# Patient Record
Sex: FEMALE | Race: BLACK OR AFRICAN AMERICAN | Employment: OTHER | URBAN - METROPOLITAN AREA
[De-identification: names, ages, dates, MRNs, and addresses within clinical notes are randomized per-mention and may not be internally consistent; named-entity substitution may affect disease eponyms.]

---

## 2018-03-26 ENCOUNTER — TRANSFERRED RECORDS (OUTPATIENT)
Dept: HEALTH INFORMATION MANAGEMENT | Facility: CLINIC | Age: 58
End: 2018-03-26

## 2019-08-02 LAB
ALT SERPL-CCNC: 15 IU/L
AST SERPL-CCNC: 13 IU/L (ref 5–40)
CREAT SERPL-MCNC: 1.21 MG/DL (ref 0.5–1)
GFR SERPL CREATININE-BSD FRML MDRD: 49 ML/MIN/1.73M2
GLUCOSE SERPL-MCNC: 105 MG/DL (ref 70–100)
POTASSIUM SERPL-SCNC: 4.3 MEQ/L (ref 3.5–5.3)
TSH SERPL-ACNC: 1.12 MU/L (ref 0.27–4.2)

## 2019-09-18 ENCOUNTER — TRANSFERRED RECORDS (OUTPATIENT)
Dept: HEALTH INFORMATION MANAGEMENT | Facility: CLINIC | Age: 59
End: 2019-09-18

## 2019-11-01 ENCOUNTER — OFFICE VISIT (OUTPATIENT)
Dept: NEUROLOGY | Facility: CLINIC | Age: 59
End: 2019-11-01
Payer: MEDICARE

## 2019-11-01 VITALS
HEIGHT: 65 IN | WEIGHT: 175.8 LBS | SYSTOLIC BLOOD PRESSURE: 128 MMHG | HEART RATE: 100 BPM | BODY MASS INDEX: 29.29 KG/M2 | DIASTOLIC BLOOD PRESSURE: 84 MMHG | OXYGEN SATURATION: 97 %

## 2019-11-01 DIAGNOSIS — R56.9 CONVULSIONS, UNSPECIFIED CONVULSION TYPE (H): Primary | ICD-10-CM

## 2019-11-01 PROCEDURE — 99214 OFFICE O/P EST MOD 30 MIN: CPT | Performed by: PSYCHIATRY & NEUROLOGY

## 2019-11-01 RX ORDER — SOLIFENACIN SUCCINATE 5 MG/1
1 TABLET, FILM COATED ORAL DAILY
COMMUNITY
Start: 2019-10-16

## 2019-11-01 RX ORDER — RISPERIDONE 4 MG/1
2 TABLET ORAL 2 TIMES DAILY
Refills: 5 | COMMUNITY
Start: 2019-09-21

## 2019-11-01 RX ORDER — LANOLIN ALCOHOL/MO/W.PET/CERES
6 CREAM (GRAM) TOPICAL AT BEDTIME
Refills: 5 | COMMUNITY
Start: 2019-10-17

## 2019-11-01 RX ORDER — FLUTICASONE PROPIONATE 50 MCG
2 SPRAY, SUSPENSION (ML) NASAL PRN
COMMUNITY
Start: 2019-04-26

## 2019-11-01 RX ORDER — MIRTAZAPINE 15 MG/1
7.5 TABLET, FILM COATED ORAL AT BEDTIME
COMMUNITY
Start: 2015-10-23

## 2019-11-01 RX ORDER — OXCARBAZEPINE 300 MG/1
2 TABLET, FILM COATED ORAL 2 TIMES DAILY
Refills: 5 | COMMUNITY
Start: 2019-10-17

## 2019-11-01 RX ORDER — BISACODYL 10 MG
10 SUPPOSITORY, RECTAL RECTAL PRN
COMMUNITY
Start: 2018-01-30

## 2019-11-01 RX ORDER — TRAZODONE HYDROCHLORIDE 150 MG/1
300 TABLET ORAL AT BEDTIME
COMMUNITY
Start: 2015-10-23

## 2019-11-01 RX ORDER — OMEGA-3-ACID ETHYL ESTERS 1 G/1
1 CAPSULE, LIQUID FILLED ORAL 2 TIMES DAILY
COMMUNITY
Start: 2019-08-20

## 2019-11-01 RX ORDER — PREGABALIN 75 MG/1
1 CAPSULE ORAL 2 TIMES DAILY
COMMUNITY
Start: 2018-12-04

## 2019-11-01 RX ORDER — ICOSAPENT ETHYL 1000 MG/1
1 CAPSULE ORAL 2 TIMES DAILY
Refills: 11 | COMMUNITY
Start: 2019-10-27

## 2019-11-01 RX ORDER — DOCUSATE SODIUM 100 MG/1
100 CAPSULE, LIQUID FILLED ORAL PRN
COMMUNITY

## 2019-11-01 RX ORDER — LEVETIRACETAM 750 MG/1
750 TABLET ORAL 2 TIMES DAILY
COMMUNITY
Start: 2018-12-12

## 2019-11-01 RX ORDER — HYDROXYZINE HYDROCHLORIDE 50 MG/1
50-100 TABLET, FILM COATED ORAL
COMMUNITY
Start: 2019-07-06

## 2019-11-01 ASSESSMENT — PAIN SCALES - GENERAL: PAINLEVEL: NO PAIN (0)

## 2019-11-01 ASSESSMENT — MIFFLIN-ST. JEOR: SCORE: 1373.3

## 2019-11-01 NOTE — NURSING NOTE
"Jessica Savage's goals for this visit include: consult  She requests these members of her care team be copied on today's visit information:     PCP: Rick Kapoor    Referring Provider:  No referring provider defined for this encounter.    /84   Pulse 100   Ht 1.651 m (5' 5\")   Wt 79.7 kg (175 lb 12.8 oz)   SpO2 97%   BMI 29.25 kg/m      Do you need any medication refills at today's visit? n  "
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
## 2019-11-05 ENCOUNTER — TELEPHONE (OUTPATIENT)
Dept: NEUROLOGY | Facility: CLINIC | Age: 59
End: 2019-11-05

## 2019-11-05 NOTE — TELEPHONE ENCOUNTER
[facility] Call Center    Phone Message    May a detailed message be left on voicemail: yes    Reason for Call: Other: patient needs mri order sent to [facility] in [facility] before she can schedule. tried calling and they didnt have it. also, patient wants to know if she should get mri before or after her epilepsy appointment?     Action Taken: Message routed to:  Adult Clinics: Neurology p [id number]

## 2019-11-05 NOTE — TELEPHONE ENCOUNTER
Dr Deleon,  Is it correct that the pt can have the 3T MRI while she is in the hospital for her inpatient EEG or do you want it done prior at [facility]?    Orquidea Stoll RN

## 2019-11-06 NOTE — TELEPHONE ENCOUNTER
Jenelle Salas MD Diaz, Melissa A RN   Caller: Unspecified (Yesterday,  1:26 PM)             Preferably before admission, but if that delays the admission, it's ok to do it in the hospital.

## 2019-11-08 ENCOUNTER — HOSPITAL ENCOUNTER (OUTPATIENT)
Facility: CLINIC | Age: 59
End: 2019-11-08
Attending: PSYCHIATRY & NEUROLOGY | Admitting: PSYCHIATRY & NEUROLOGY
Payer: MEDICARE

## 2019-11-14 ENCOUNTER — TRANSFERRED RECORDS (OUTPATIENT)
Dept: HEALTH INFORMATION MANAGEMENT | Facility: CLINIC | Age: 59
End: 2019-11-14

## 2019-12-02 ENCOUNTER — ALLIED HEALTH/NURSE VISIT (OUTPATIENT)
Dept: NURSING | Facility: CLINIC | Age: 59
End: 2019-12-02
Payer: MEDICARE

## 2019-12-02 ENCOUNTER — PATIENT OUTREACH (OUTPATIENT)
Dept: NEUROLOGY | Facility: CLINIC | Age: 59
End: 2019-12-02

## 2019-12-02 VITALS
DIASTOLIC BLOOD PRESSURE: 73 MMHG | HEART RATE: 91 BPM | SYSTOLIC BLOOD PRESSURE: 105 MMHG | RESPIRATION RATE: 30 BRPM | OXYGEN SATURATION: 100 %

## 2019-12-02 DIAGNOSIS — R56.9 CONVULSIONS (H): Primary | ICD-10-CM

## 2019-12-02 PROCEDURE — 99211 OFF/OP EST MAY X REQ PHY/QHP: CPT

## 2019-12-02 NOTE — PROGRESS NOTES
Pre Admission contact prior to inpatient continuous video EEG monitoring at the [facility]    MD provided information:  MINCEP provider:Jenelle Salas M.D.  Admission date/time: 12/3/19  Reason for admit Event characterization - Differential Diagnosis       Nurse Admission Checklist:  Nurse education regarding admission:Met with Clinic Nurse: With Susana Stoll RN today.  EEG prior to admission? Yes   VEEG Consent scanned on file :No  Imaging files location: [facility]    Additional care needs -  Does the patient have behavioral needs?: No  If patient has developmental delay is family staying? n/a    What level of ambulation assistance required: Gait Belt    Nicotine use; tolerance of nicotine patch. n/a, nonsmoker     needs: No    For admitting nurse:  Is this a local patient? Yes  Home environment : lives alone with skilled nursing home visits.   Discharge transportation: Private Car   Harness room: No        Tiffani Bautista RN  Epilepsy Nurse Coordinator

## 2019-12-02 NOTE — PROGRESS NOTES
Jessica Savage comes into clinic today at the request of Dr Deleon Ordering Provider for Pt Teaching for inpatient video EEG scheduled tomorrow.    RN noticed as we were walking to the exam room that she was breathing heavily. She states that this is not a new thing but seemed to have gotten worse over Thanksgiving while traveling to [place] to see her family.     Vital signs taken and besides the elevated respiratory rate, they were normal /73   Pulse 91   Resp 30   SpO2 100% . She denies chest/arm/back/neck pain, vision changes, new weakness or fever.   She denies upper respiratory infection but does confirm history of pulmonary sarcoidosis.   RN explained the concern and advised her to be evaluated in the ED today. Her transportation service cannot bring her and she is unable to drive. When asked about having a family member take her, she stated that her daughter works until 5pm tonight. Was able to get authorization from the pt to speak to her daughter over the phone about my concerns.   Her daughter expressed the same concerns and had been trying to get her in to be evaluated over the weekend with no success. She was really worried yesterday because the pt was having RN had a discussion with the pt and her daughter about having her sent to the ED at [place] [facility] by ambulance but the pt refused so the daughter was going to leave work to go meet her at home and bring her to the ED.   Again the situation was laid out for the pt and recommendations for ambulance transport to ED was suggested. She again refused saying I will go home and my daughter can take me in.  RN escorted the pt to the transportation van at the entrance and requested that the  make sure that she gets into the house safely.   RN did get a call from the ED doctor stating that the pt was in the ED and asked for further explanation of what happened earlier and why she presented to the ED because she wasn't getting clear information  from the pt or her daughter.   After review of Care Everywhere, it looks like they did an EKG, chest CTA, ordered labs and gave her IV fluids.     The pt is still scheduled for her inpatient video EEG monitoring tomorrow and this update will be sent to Dr Deleon to review.     The pt was able to watch the EEG instructional video with lasts 30 minutes and 45 minutes was spent with the pt performing an assessment and coordination of care.     This service provided today was under the supervising provider of the day Dr Bashir Jaimes, who was available if needed.    Orquidea Stoll RN

## 2019-12-03 ENCOUNTER — TELEPHONE (OUTPATIENT)
Dept: NEUROLOGY | Facility: CLINIC | Age: 59
End: 2019-12-03

## 2019-12-03 NOTE — TELEPHONE ENCOUNTER
Call placed to patient's daughter to inquire about patient's current status. Per chart review, patient was admitted yesterday evening to [facility]. Sherin confirms that Jessica has been admitted to [facility] in [place] and will reschedule her vEEG admission when she is discharged from the hospital. The admitting unit was notified of the canceled admission.

## 2021-06-16 PROBLEM — R60.0 BILATERAL LEG EDEMA: Status: ACTIVE | Noted: 2017-08-09

## 2021-06-16 PROBLEM — I45.81 LONG Q-T SYNDROME: Status: ACTIVE | Noted: 2019-07-08

## 2021-06-16 PROBLEM — N32.81 OVERACTIVE DETRUSOR: Status: ACTIVE | Noted: 2019-02-19

## 2021-06-16 PROBLEM — E87.20 LACTIC ACIDOSIS: Status: ACTIVE | Noted: 2019-07-08

## 2021-06-16 PROBLEM — G40.909 SEIZURE DISORDER (H): Status: ACTIVE | Noted: 2019-07-10

## 2021-06-16 PROBLEM — N39.41 URGE INCONTINENCE: Status: ACTIVE | Noted: 2019-02-19

## 2021-06-16 PROBLEM — R29.6 FREQUENT FALLS: Status: ACTIVE | Noted: 2017-02-20

## 2021-06-16 PROBLEM — K59.03 DRUG-INDUCED CONSTIPATION: Status: ACTIVE | Noted: 2018-01-30

## 2021-06-16 PROBLEM — R56.9 SEIZURE (H): Status: ACTIVE | Noted: 2019-07-08

## 2021-06-16 PROBLEM — G40.909 EPILEPSY POSTTRAUMATIC (H): Status: ACTIVE | Noted: 2018-01-30

## 2021-06-16 PROBLEM — H52.203 MYOPIA OF BOTH EYES WITH ASTIGMATISM AND PRESBYOPIA: Status: ACTIVE | Noted: 2017-11-22

## 2021-06-16 PROBLEM — N39.3 URINE, INCONTINENCE, STRESS FEMALE: Status: ACTIVE | Noted: 2017-02-20

## 2021-06-16 PROBLEM — Z79.899 POLYPHARMACY: Status: ACTIVE | Noted: 2019-08-02

## 2021-06-16 PROBLEM — H52.4 MYOPIA OF BOTH EYES WITH ASTIGMATISM AND PRESBYOPIA: Status: ACTIVE | Noted: 2017-11-22

## 2021-06-16 PROBLEM — S06.9XAS EPILEPSY POSTTRAUMATIC (H): Status: ACTIVE | Noted: 2018-01-30

## 2021-06-16 PROBLEM — H52.13 MYOPIA OF BOTH EYES WITH ASTIGMATISM AND PRESBYOPIA: Status: ACTIVE | Noted: 2017-11-22

## 2021-06-16 PROBLEM — I95.1 ORTHOSTATIC HYPOTENSION: Status: ACTIVE | Noted: 2019-07-08

## 2022-02-17 PROBLEM — Z00.00 PREVENTATIVE HEALTH CARE: Status: ACTIVE | Noted: 2018-01-30
